# Patient Record
Sex: MALE | Race: WHITE | URBAN - METROPOLITAN AREA
[De-identification: names, ages, dates, MRNs, and addresses within clinical notes are randomized per-mention and may not be internally consistent; named-entity substitution may affect disease eponyms.]

---

## 2021-09-10 ENCOUNTER — OFFICE VISIT (OUTPATIENT)
Dept: URBAN - METROPOLITAN AREA CLINIC 107 | Facility: CLINIC | Age: 41
End: 2021-09-10

## 2021-09-22 ENCOUNTER — OFFICE VISIT (OUTPATIENT)
Dept: URBAN - METROPOLITAN AREA CLINIC 107 | Facility: CLINIC | Age: 41
End: 2021-09-22
Payer: COMMERCIAL

## 2021-09-22 ENCOUNTER — WEB ENCOUNTER (OUTPATIENT)
Dept: URBAN - METROPOLITAN AREA CLINIC 107 | Facility: CLINIC | Age: 41
End: 2021-09-22

## 2021-09-22 ENCOUNTER — DASHBOARD ENCOUNTERS (OUTPATIENT)
Age: 41
End: 2021-09-22

## 2021-09-22 VITALS
WEIGHT: 253 LBS | SYSTOLIC BLOOD PRESSURE: 146 MMHG | BODY MASS INDEX: 32.47 KG/M2 | TEMPERATURE: 97.8 F | HEIGHT: 74 IN | DIASTOLIC BLOOD PRESSURE: 99 MMHG | RESPIRATION RATE: 18 BRPM | HEART RATE: 102 BPM

## 2021-09-22 DIAGNOSIS — R74.8 ELEVATED LIVER ENZYMES: ICD-10-CM

## 2021-09-22 DIAGNOSIS — K76.89 ABNORMAL FINDING ON LIVER FUNCTION: ICD-10-CM

## 2021-09-22 PROBLEM — 300331000 LESION OF LIVER: Status: ACTIVE | Noted: 2021-09-22

## 2021-09-22 PROCEDURE — 99244 OFF/OP CNSLTJ NEW/EST MOD 40: CPT | Performed by: INTERNAL MEDICINE

## 2021-09-22 RX ORDER — ASPIRIN 81 MG/1
1 TABLET TABLET, COATED ORAL ONCE A DAY
Status: ACTIVE | COMMUNITY

## 2021-09-22 RX ORDER — PANTOPRAZOLE SODIUM 20 MG/1
1 TABLET TABLET, DELAYED RELEASE ORAL ONCE A DAY
Status: ACTIVE | COMMUNITY

## 2021-09-22 NOTE — HPI-TODAY'S VISIT:
40yo male with a history of hypertension referred by MEGHNA Moses for evaluation of abdominal pain. A copy of this document is being forwarded to the referring provider.  He reports two episodes of kidney stones this year, prompting multiple imaging studies at [facility]. Recent CT showed a liver lesion, increased in size when compared to previous study in January. Per the patient and his wife, he had a follow-up ultrasound which showed a possible hemangioma. However, the lesion has reportedly increased in size from 4-7mm within the last 6 months. These studies are not readily available for review.  He has occasional generalized abdominal pain which is not related to eating or bowel movements. He denies nausea or vomiting. His bowel habits are regular without blood per rectum. No unintentional weight loss. He complains of diffuse itching when he sweats, which is new. He denies NSAID use aside from aspirin 81mg daily. He denies alcohol, tobacco, or supplement use. There is no family history of GI malignancy.

## 2021-09-22 NOTE — HPI-OTHER HISTORIES
Labs  8/22/21: AST 38, ALT 48, ALP 59, Tbili 1.3. 8/20/21: H/H 18.4/57.5, MCV 91, , WBC 13. CT a/p without contrast 8/20/21: question nonobstructing left renal stone, hypodensity in the medial segment of the right lobe of the liver appears to have increased from prior; neoplasm not excluded.

## 2021-09-27 ENCOUNTER — TELEPHONE ENCOUNTER (OUTPATIENT)
Dept: URBAN - METROPOLITAN AREA CLINIC 113 | Facility: CLINIC | Age: 41
End: 2021-09-27

## 2021-10-08 LAB
A/G RATIO: (no result)
ACTIN (SMOOTH MUSCLE) ANTIBODY: 10
ALBUMIN: (no result)
ALKALINE PHOSPHATASE: (no result)
ALT (SGPT): (no result)
ANA DIRECT: NEGATIVE
AST (SGOT): (no result)
BASO (ABSOLUTE): 0
BASOS: 1
BILIRUBIN, TOTAL: (no result)
BUN/CREATININE RATIO: (no result)
BUN: (no result)
CALCIUM: (no result)
CARBON DIOXIDE, TOTAL: (no result)
CHLORIDE: (no result)
CREATININE: (no result)
EGFR IF AFRICN AM: (no result)
EGFR IF NONAFRICN AM: (no result)
EOS (ABSOLUTE): 0.1
EOS: 1
FERRITIN, SERUM: 34
GLOBULIN, TOTAL: (no result)
GLUCOSE: (no result)
HBSAG SCREEN: NEGATIVE
HEMATOCRIT: 50.8
HEMATOLOGY COMMENTS:: (no result)
HEMOGLOBIN: 17
HEP A AB, TOTAL: NEGATIVE
HEP B CORE AB, TOT: NEGATIVE
HEP B SURFACE AB, QUAL: NON REACTIVE
HEP C VIRUS AB: <0.1
IMMATURE CELLS: (no result)
IMMATURE GRANS (ABS): 0
IMMATURE GRANULOCYTES: 1
LYMPHS (ABSOLUTE): 1.7
LYMPHS: 27
MCH: 29.2
MCHC: 33.5
MCV: 87
MITOCHONDRIAL (M2) ANTIBODY: <20
MONOCYTES(ABSOLUTE): 0.6
MONOCYTES: 10
NEUTROPHILS (ABSOLUTE): 3.9
NEUTROPHILS: 60
NRBC: (no result)
PLATELETS: 260
POTASSIUM: (no result)
PROTEIN, TOTAL: (no result)
RBC: 5.82
RDW: 13.3
REQUEST PROBLEM: (no result)
SODIUM: (no result)
WBC: 6.4

## 2021-12-03 ENCOUNTER — OFFICE VISIT (OUTPATIENT)
Dept: URBAN - METROPOLITAN AREA CLINIC 107 | Facility: CLINIC | Age: 41
End: 2021-12-03

## 2022-01-05 ENCOUNTER — OFFICE VISIT (OUTPATIENT)
Dept: URBAN - METROPOLITAN AREA CLINIC 107 | Facility: CLINIC | Age: 42
End: 2022-01-05

## 2022-02-16 ENCOUNTER — OFFICE VISIT (OUTPATIENT)
Dept: URBAN - METROPOLITAN AREA CLINIC 107 | Facility: CLINIC | Age: 42
End: 2022-02-16